# Patient Record
Sex: FEMALE | Race: WHITE | Employment: FULL TIME | URBAN - METROPOLITAN AREA
[De-identification: names, ages, dates, MRNs, and addresses within clinical notes are randomized per-mention and may not be internally consistent; named-entity substitution may affect disease eponyms.]

---

## 2023-05-24 NOTE — PROGRESS NOTES
HPI:  Patient complains of recurring redness in both eyes for the past 2 months. Symptoms improved with lotemax and warm compress but returns after stopping treatments. Patient had also taken polytrim but no resolution. There is inflammation around the eyelids and grittiness in both eyes. Saw an eye doctor in [facility].       Pertinent Medical History:    N/A    Ocular History:    Conjunctivitis, both eyes.     Eye Medications:    Antibiotic eye drops.     Lotemax both eyes. For 2 months  Now.     Assessment and Plan:  1.   Blepharitis, both eyes.     Start ocusoft lid wipes 2 times daily both eyes.     Can consider steroid ointment if the eyelids are still inflamed. Currently the blepharitis appears mild.     2.   Moderate Allergic Conjunctivitis, both eyes.     Start Pataday extra strength both eyes. For at least 4 months. Start 05/23/2023. Patient has been using pataday in the past and symptoms flare after stopping. Might need pataday long term.     Continue zyrtec daily.     Discontinue antibiotics.     Patient has been on lotemax eye drops for 2 months. Taper off lotemax: 2 times daily for 2 weeks, then 1 time daily for weeks.     We discussed that sometime chronic conjunctivitis could be secondary to STI's like gonorhea. Patient does not have concerns at this juncture. On exam, findings are most consistent with chronic moderately severe allergic conjunctivitis.     Recommend allergist evaluation.     Goal is to not eliminate but to improve symptoms.     Follow up in 3-4 weeks.     3.   Meibomian Gland Dysfunction, both eyes,.     Preservative free artificial tears 4 times daily both eyes. Refresh or systane.     Keep all eye drops 5 minutes.     Warm compress with dry eye mask, at bedtime, 10 minutes, both eyes.         Patient consented to a dilated eye exam:    No. We discussed risks and benefits of a dilated eye exam. Recently had a dilated eye exam.     Medical History:  No past medical history on  file.    Medications:  No current outpatient medications on file.   Complete documentation of historical and exam elements from today's encounter can be found in the full encounter summary report (not reduplicated in this progress note). I personally obtained the chief complaint(s) and history of present illness.  I confirmed and edited as necessary the review of systems, past medical/surgical history, family history, social history, and examination findings as documented by others; and I examined the patient myself. I personally reviewed the relevant tests, images, and reports as documented above. I formulated and edited as necessary the assessment and plan and discussed the findings and management plan with the patient and family. - Jeanette Ugalde OD

## 2023-05-25 ENCOUNTER — OFFICE VISIT (OUTPATIENT)
Dept: OPHTHALMOLOGY | Facility: CLINIC | Age: 23
End: 2023-05-25
Attending: OPTOMETRIST
Payer: OTHER GOVERNMENT

## 2023-05-25 DIAGNOSIS — H10.13 ACUTE ALLERGIC CONJUNCTIVITIS OF BOTH EYES: Primary | ICD-10-CM

## 2023-05-25 PROCEDURE — 92002 INTRM OPH EXAM NEW PATIENT: CPT | Performed by: OPTOMETRIST

## 2023-05-25 PROCEDURE — G0463 HOSPITAL OUTPT CLINIC VISIT: HCPCS | Performed by: OPTOMETRIST

## 2023-05-25 RX ORDER — SPIRONOLACTONE 50 MG/1
50 TABLET, FILM COATED ORAL DAILY
COMMUNITY
Start: 2023-03-21

## 2023-05-25 RX ORDER — IBUPROFEN 600 MG/1
600 TABLET, FILM COATED ORAL EVERY 6 HOURS PRN
COMMUNITY
Start: 2022-06-06

## 2023-05-25 RX ORDER — POLYMYXIN B SULFATE AND TRIMETHOPRIM 1; 10000 MG/ML; [USP'U]/ML
SOLUTION OPHTHALMIC
COMMUNITY
Start: 2023-04-25

## 2023-05-25 RX ORDER — ADAPALENE GEL USP, 0.3% 3 MG/G
GEL TOPICAL
COMMUNITY
Start: 2023-01-03

## 2023-05-25 RX ORDER — SULFACETAMIDE SODIUM 100 MG/ML
LOTION TOPICAL
COMMUNITY
Start: 2022-10-04

## 2023-05-25 RX ORDER — ISOTRETINOIN 30 MG/1
CAPSULE, GELATIN COATED ORAL
COMMUNITY
Start: 2023-05-24

## 2023-05-25 RX ORDER — LOTEPREDNOL ETABONATE 5 MG/ML
SUSPENSION/ DROPS OPHTHALMIC
COMMUNITY
Start: 2023-03-21

## 2023-05-25 ASSESSMENT — CONF VISUAL FIELD
OS_SUPERIOR_NASAL_RESTRICTION: 0
OS_NORMAL: 1
OD_INFERIOR_NASAL_RESTRICTION: 0
OD_INFERIOR_TEMPORAL_RESTRICTION: 0
OD_SUPERIOR_TEMPORAL_RESTRICTION: 0
OS_SUPERIOR_TEMPORAL_RESTRICTION: 0
OD_NORMAL: 1
OS_INFERIOR_TEMPORAL_RESTRICTION: 0
OD_SUPERIOR_NASAL_RESTRICTION: 0
OS_INFERIOR_NASAL_RESTRICTION: 0

## 2023-05-25 ASSESSMENT — VISUAL ACUITY
OS_SC: 20/25
METHOD: SNELLEN - LINEAR
OD_SC: 20/25

## 2023-05-25 ASSESSMENT — EXTERNAL EXAM - RIGHT EYE: OD_EXAM: NORMAL

## 2023-05-25 ASSESSMENT — SLIT LAMP EXAM - LIDS
COMMENTS: BLEPHARITIS, MGD
COMMENTS: BLEPHARITIS, MGD

## 2023-05-25 ASSESSMENT — EXTERNAL EXAM - LEFT EYE: OS_EXAM: NORMAL

## 2023-05-25 ASSESSMENT — TONOMETRY
OS_IOP_MMHG: 19
IOP_METHOD: ICARE
OD_IOP_MMHG: 19

## 2023-05-25 NOTE — NURSING NOTE
Chief Complaints and History of Present Illnesses   Patient presents with     Conjunctivitis Evaluation     Chief Complaint(s) and History of Present Illness(es)     Conjunctivitis Evaluation            Laterality: both eyes          Comments    Pt. States that she has had recurring redness and conjunctivitis for the last 2 months. Symptoms improve after treating with lotemax and polymoxin, but return as soon as finishing treatment. Symptoms always start in the right eye and then move to LE. Has been using WC's and AT's with no improvement. RE feels swollen today.   Mar SILVERIO 8:47 AM May 25, 2023

## 2023-06-01 NOTE — PROGRESS NOTES
HPI:  Patient presents for follow up of allergic conjunctivitis. Patient has seen an allergist - tested positive for cats and dogs, and environmental allergies. Overall eye symptoms have improved.     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       Pertinent Medical History:    N/A    Ocular History:    Blepharitis, both eyes.     Meibomian Gland Dysfunction, both eyes.     Allergic Conjunctivitis, both eyes.     Eye Medications:    Allergic to amoxicillins    Antibiotic eye drops.     Lotemax both eyes. For 2 months  Now.     Accutane use?    Assessment and Plan:    #   Moderate Allergic Conjunctivitis, both eyes.     Pataday extra strength daily both eyes. Use twice daily as needed. For at least 4 months. Start 05/23/2023. Might need pataday long term.     Taped off of lotemax. Can use steroids for flare ups.     Saw an allergist - allergic to cats and dogs, environmental allergies. Started singular. Can consider immunotherapy in the future.     Follow up in 4-6 months in the fall season.     #   Blepharitis, both eyes.     Ocusoft lid wipes once daily both eyes.     #   Meibomian Gland Dysfunction, both eyes.     Preservative free artificial tears 4 times daily both eyes. Refresh or systane.     Warm compress with dry eye mask, at bedtime, 10 minutes, both eyes.         Patient consented to a dilated eye exam:    No. We discussed risks and benefits of a dilated eye exam. Recently had a dilated eye exam.     Medical History:  No past medical history on file.    Medications:  Current Outpatient Medications   Medication Sig Dispense Refill     adapalene (DIFFERIN) 0.3 % external gel APPLY PEA SIZED AMOUNT TOPICALLY TO FACE AND CHEST AT BEDTIME       ibuprofen (ADVIL/MOTRIN) 600 MG tablet Take 600 mg by mouth every 6 hours as needed       loteprednol (LOTEMAX) 0.5 % ophthalmic suspension 1 drop BOTH EYES THREE TIMES A DAY       spironolactone (ALDACTONE) 50 MG tablet Take 50 mg by mouth daily        sulfacetamide sodium, Acne, 10 % lotion APPLY TOPPICALLY TWICE DAILY.       trimethoprim-polymyxin b (POLYTRIM) 30379-9.1 UNIT/ML-% ophthalmic solution INSTILL 1 DROP IN RIGHT EYE FOUR TIMES DAILY FOR 7 DAYS       ZENATANE 30 MG capsule      Complete documentation of historical and exam elements from today's encounter can be found in the full encounter summary report (not reduplicated in this progress note). I personally obtained the chief complaint(s) and history of present illness.  I confirmed and edited as necessary the review of systems, past medical/surgical history, family history, social history, and examination findings as documented by others; and I examined the patient myself. I personally reviewed the relevant tests, images, and reports as documented above. I formulated and edited as necessary the assessment and plan and discussed the findings and management plan with the patient and family. - Jeanette Ugalde, ANTHONY

## 2023-06-04 ENCOUNTER — HEALTH MAINTENANCE LETTER (OUTPATIENT)
Age: 23
End: 2023-06-04

## 2023-06-15 ENCOUNTER — OFFICE VISIT (OUTPATIENT)
Dept: OPHTHALMOLOGY | Facility: CLINIC | Age: 23
End: 2023-06-15
Attending: OPTOMETRIST
Payer: OTHER GOVERNMENT

## 2023-06-15 DIAGNOSIS — H10.13 ACUTE ALLERGIC CONJUNCTIVITIS OF BOTH EYES: Primary | ICD-10-CM

## 2023-06-15 DIAGNOSIS — H02.886 MEIBOMIAN GLAND DYSFUNCTION (MGD) OF BOTH EYES: ICD-10-CM

## 2023-06-15 DIAGNOSIS — H01.014 ULCERATIVE BLEPHARITIS OF UPPER EYELIDS OF BOTH EYES: ICD-10-CM

## 2023-06-15 DIAGNOSIS — H01.011 ULCERATIVE BLEPHARITIS OF UPPER EYELIDS OF BOTH EYES: ICD-10-CM

## 2023-06-15 DIAGNOSIS — H02.883 MEIBOMIAN GLAND DYSFUNCTION (MGD) OF BOTH EYES: ICD-10-CM

## 2023-06-15 PROCEDURE — G0463 HOSPITAL OUTPT CLINIC VISIT: HCPCS | Performed by: OPTOMETRIST

## 2023-06-15 PROCEDURE — 92012 INTRM OPH EXAM EST PATIENT: CPT | Performed by: OPTOMETRIST

## 2023-06-15 ASSESSMENT — TONOMETRY
OS_IOP_MMHG: 17
IOP_METHOD: ICARE
OD_IOP_MMHG: 17

## 2023-06-15 ASSESSMENT — VISUAL ACUITY
METHOD: SNELLEN - LINEAR
OS_SC: 20/25
OD_SC: 20/30

## 2023-06-15 ASSESSMENT — EXTERNAL EXAM - RIGHT EYE: OD_EXAM: NORMAL

## 2023-06-15 ASSESSMENT — CONF VISUAL FIELD
OS_SUPERIOR_NASAL_RESTRICTION: 0
METHOD: COUNTING FINGERS
OD_NORMAL: 1
OS_NORMAL: 1
OD_SUPERIOR_TEMPORAL_RESTRICTION: 0
OD_INFERIOR_NASAL_RESTRICTION: 0
OS_SUPERIOR_TEMPORAL_RESTRICTION: 0
OD_INFERIOR_TEMPORAL_RESTRICTION: 0
OD_SUPERIOR_NASAL_RESTRICTION: 0
OS_INFERIOR_NASAL_RESTRICTION: 0
OS_INFERIOR_TEMPORAL_RESTRICTION: 0

## 2023-06-15 ASSESSMENT — SLIT LAMP EXAM - LIDS
COMMENTS: BLEPHARITIS, MGD
COMMENTS: BLEPHARITIS, MGD

## 2023-06-15 ASSESSMENT — EXTERNAL EXAM - LEFT EYE: OS_EXAM: NORMAL

## 2023-06-15 NOTE — NURSING NOTE
Chief Complaints and History of Present Illnesses   Patient presents with     Follow Up     3-4 week follow up for moderate Allergic conjunctivitis each eye, Blepharitis each eye, and MGD each eye.     Patient notes her eyes feel a little better, but she still is noticing some redness, irritation, and discharge in the inner corners. Patient states vision is stable each eye in the last few weeks. Patient tapered off Lotemax drops as prescribed.        Chief Complaint(s) and History of Present Illness(es)     Follow Up            Laterality: both eyes    Onset: weeks ago    Associated symptoms: redness (a little) and discharge (inner corners)    Treatments tried: eye drops, artificial tears and warm compresses    Comments: 3-4 week follow up for moderate Allergic conjunctivitis each eye, Blepharitis each eye, and MGD each eye.     Patient notes her eyes feel a little better, but she still is noticing some redness, irritation, and discharge in the inner corners. Patient states vision is stable each eye in the last few weeks. Patient tapered off Lotemax drops as prescribed.             Comments    Ocular meds:   - Pataday daily each eye  - PFAT QID each eye   - Warm compresses at bedtime each eye   - Zyrtec daily PO     JEAN CLAUDE Bland 8:24 AM 06/15/2023

## 2024-07-28 ENCOUNTER — HEALTH MAINTENANCE LETTER (OUTPATIENT)
Age: 24
End: 2024-07-28

## 2025-08-10 ENCOUNTER — HEALTH MAINTENANCE LETTER (OUTPATIENT)
Age: 25
End: 2025-08-10